# Patient Record
Sex: MALE | Race: WHITE | NOT HISPANIC OR LATINO
[De-identification: names, ages, dates, MRNs, and addresses within clinical notes are randomized per-mention and may not be internally consistent; named-entity substitution may affect disease eponyms.]

---

## 2020-01-15 ENCOUNTER — APPOINTMENT (OUTPATIENT)
Dept: CARDIOLOGY | Facility: CLINIC | Age: 55
End: 2020-01-15
Payer: COMMERCIAL

## 2020-01-15 ENCOUNTER — NON-APPOINTMENT (OUTPATIENT)
Age: 55
End: 2020-01-15

## 2020-01-15 VITALS
HEIGHT: 75 IN | TEMPERATURE: 98.3 F | BODY MASS INDEX: 29.59 KG/M2 | OXYGEN SATURATION: 95 % | DIASTOLIC BLOOD PRESSURE: 72 MMHG | HEART RATE: 75 BPM | SYSTOLIC BLOOD PRESSURE: 130 MMHG | WEIGHT: 238 LBS

## 2020-01-15 DIAGNOSIS — Z78.9 OTHER SPECIFIED HEALTH STATUS: ICD-10-CM

## 2020-01-15 DIAGNOSIS — Z83.3 FAMILY HISTORY OF DIABETES MELLITUS: ICD-10-CM

## 2020-01-15 DIAGNOSIS — I51.7 CARDIOMEGALY: ICD-10-CM

## 2020-01-15 DIAGNOSIS — I77.810 THORACIC AORTIC ECTASIA: ICD-10-CM

## 2020-01-15 DIAGNOSIS — Z86.39 PERSONAL HISTORY OF OTHER ENDOCRINE, NUTRITIONAL AND METABOLIC DISEASE: ICD-10-CM

## 2020-01-15 DIAGNOSIS — Z00.00 ENCOUNTER FOR GENERAL ADULT MEDICAL EXAMINATION W/OUT ABNORMAL FINDINGS: ICD-10-CM

## 2020-01-15 DIAGNOSIS — Z82.5 FAMILY HISTORY OF ASTHMA AND OTHER CHRONIC LOWER RESPIRATORY DISEASES: ICD-10-CM

## 2020-01-15 DIAGNOSIS — Z86.010 PERSONAL HISTORY OF COLONIC POLYPS: ICD-10-CM

## 2020-01-15 DIAGNOSIS — M50.90 CERVICAL DISC DISORDER, UNSPECIFIED, UNSPECIFIED CERVICAL REGION: ICD-10-CM

## 2020-01-15 DIAGNOSIS — Z87.898 PERSONAL HISTORY OF OTHER SPECIFIED CONDITIONS: ICD-10-CM

## 2020-01-15 DIAGNOSIS — Z86.79 PERSONAL HISTORY OF OTHER DISEASES OF THE CIRCULATORY SYSTEM: ICD-10-CM

## 2020-01-15 DIAGNOSIS — Z83.438 FAMILY HISTORY OF OTHER DISORDER OF LIPOPROTEIN METABOLISM AND OTHER LIPIDEMIA: ICD-10-CM

## 2020-01-15 DIAGNOSIS — Z80.1 FAMILY HISTORY OF MALIGNANT NEOPLASM OF TRACHEA, BRONCHUS AND LUNG: ICD-10-CM

## 2020-01-15 DIAGNOSIS — Z82.49 FAMILY HISTORY OF ISCHEMIC HEART DISEASE AND OTHER DISEASES OF THE CIRCULATORY SYSTEM: ICD-10-CM

## 2020-01-15 DIAGNOSIS — Z85.828 PERSONAL HISTORY OF OTHER MALIGNANT NEOPLASM OF SKIN: ICD-10-CM

## 2020-01-15 DIAGNOSIS — Z81.8 FAMILY HISTORY OF OTHER MENTAL AND BEHAVIORAL DISORDERS: ICD-10-CM

## 2020-01-15 PROCEDURE — 99205 OFFICE O/P NEW HI 60 MIN: CPT

## 2020-01-15 PROCEDURE — 93000 ELECTROCARDIOGRAM COMPLETE: CPT

## 2020-01-15 RX ORDER — UBIDECARENONE/VIT E ACET 100MG-5
CAPSULE ORAL
Refills: 0 | Status: ACTIVE | COMMUNITY

## 2020-01-16 PROBLEM — Z00.00 ENCOUNTER FOR PREVENTIVE HEALTH EXAMINATION: Status: ACTIVE | Noted: 2020-01-07

## 2020-01-16 PROBLEM — I77.810 ASCENDING AORTA DILATATION: Status: ACTIVE | Noted: 2020-01-16

## 2020-01-16 PROBLEM — I51.7 LEFT VENTRICULAR HYPERTROPHY: Status: ACTIVE | Noted: 2020-01-16

## 2020-01-16 RX ORDER — ATORVASTATIN CALCIUM 80 MG/1
TABLET, FILM COATED ORAL
Refills: 0 | Status: ACTIVE | COMMUNITY

## 2020-01-16 NOTE — DISCUSSION/SUMMARY
[FreeTextEntry1] : Ascending Aorta Dilatation\par \par The 1/20 echocardiogram demonstrated mild aortic root and ascending aortic dilatation (4.5 cm). A bicuspid aortic valve could not be ruled out. There is no history of hypertension. The  presents physical examination is not suggestive of hemodynamically significant valvular pathology. Surgical intervention is not required for aneurysms less than 5 cm. \par \par I have recommended the following:\par 1. repeat echocardiogram in one year\par 2.  chest CAT scan depending on followup studies\par \par \par \par Left ventricular hypertrophy\par The 1/20 echocardiogram showed evidence of  left ventricular hypertrophy. The left ventricular posterior wall dimension was 1.18 cm . The interventricular septal dimension was 1.9 cm. The etiology is obscure. There is no history of hypertension. The possibility of sleep apnea is being investigated . The degree of obesity does not appear to be severe enough to account for significant left ventricular hypertrophy.\par \par \par I have recommended  the following:\par 1. repeat echocardiogram in one year\par \par \par Hyperlipidemia\par Hyperlipidemia represents a risk factor for the development of atherosclerotic heart disease. However, there is no evidence of such to date. A  1/20 CT of the heart calcium score was zero. The resting 1/20 electrocardiogram shows no evidence of myocardial ischemia or infarction. The target LDL level primary prevention is about 100. HMG co A reductase inhibitor therapy has been effective. In  10/19  the serum  cholesterol level was 163 triglycerides 75 HDL 49 LDL 99. Nonpharmacological therapy, specifically diet exercise and weight loss are  emphasized as  major aspects of  treatment.\par \par I have recommended the following:\par 1 low-salt low-fat low-cholesterol diet. Regular aerobic exercise\par 2 continue present medical regimen\par 3 target LDL level to about 100 as discussed above\par \par The diagnosis, prognosis, risks, options and alternatives were explained at length with the patient. All  questions were answered . Issues discussed included aortic root dilatation ascending aortic dilatation, sleep apnea and left ventricular hypertrophy.\par \par \par \par

## 2020-01-16 NOTE — HISTORY OF PRESENT ILLNESS
[FreeTextEntry1] : 55-year-old and\par Cardiology  consultation requested because of an abnormal echocardiogram\par \par Mr. Presley has no known heart disease. There is no history of a myocardial infarction ,angina heart failure  or valve pathology.  A  1/20 echocardiogram was performed in evaluation of a heart murmur. . The study demonstrated moderate to severe left ventricular hypertrophy and aortic valve sclerosis. A bicuspid aortic valve cannot be ruled out. Dilatation of the ascending aorta at 4.5 cm was noted.  A cardiology consultation was then requested.\par \par Tima has no history of hypertension diabetes smoking or illicit drug use. He denies symptoms of chest pain, shortness of breath, palpitations or syncope. Evaluation for sleep apnea is pending.\par \par Mr Presley presents  today for cardiovascular evaluation.\par \par

## 2020-01-16 NOTE — PHYSICAL EXAM
[Normal Conjunctiva] : the conjunctiva exhibited no abnormalities [Heart Rate And Rhythm] : heart rate and rhythm were normal [Heart Sounds] : normal S1 and S2 [Bowel Sounds] : normal bowel sounds [Auscultation Breath Sounds / Voice Sounds] : lungs were clear to auscultation bilaterally [Abdomen Tenderness] : non-tender [Abdomen Soft] : soft [Abnormal Walk] : normal gait [] : no rash [Affect] : the affect was normal [FreeTextEntry1] : pleasant

## 2020-12-19 ENCOUNTER — NON-APPOINTMENT (OUTPATIENT)
Age: 55
End: 2020-12-19

## 2020-12-28 DIAGNOSIS — I35.8 OTHER NONRHEUMATIC AORTIC VALVE DISORDERS: ICD-10-CM

## 2021-02-17 ENCOUNTER — APPOINTMENT (OUTPATIENT)
Dept: CARDIOLOGY | Facility: CLINIC | Age: 56
End: 2021-02-17

## 2021-02-24 ENCOUNTER — APPOINTMENT (OUTPATIENT)
Dept: CARDIOLOGY | Facility: CLINIC | Age: 56
End: 2021-02-24

## 2021-03-25 ENCOUNTER — APPOINTMENT (OUTPATIENT)
Dept: CARDIOLOGY | Facility: CLINIC | Age: 56
End: 2021-03-25

## 2022-11-03 ENCOUNTER — HOSPITAL ENCOUNTER (EMERGENCY)
Age: 57
Discharge: LEFT BEFORE DISCHARGE | End: 2022-11-03

## 2022-11-03 ENCOUNTER — APPOINTMENT (OUTPATIENT)
Dept: GENERAL RADIOLOGY | Age: 57
End: 2022-11-03
Attending: EMERGENCY MEDICINE

## 2022-11-03 VITALS
OXYGEN SATURATION: 97 % | DIASTOLIC BLOOD PRESSURE: 92 MMHG | TEMPERATURE: 98.2 F | WEIGHT: 235.01 LBS | HEART RATE: 75 BPM | SYSTOLIC BLOOD PRESSURE: 151 MMHG | RESPIRATION RATE: 17 BRPM

## 2022-11-03 PROCEDURE — 73140 X-RAY EXAM OF FINGER(S): CPT

## 2022-11-03 PROCEDURE — 10003627 HB COUNTER ED NO SERVICE

## 2022-11-03 PROCEDURE — 10004651 HB RX, NO CHARGE ITEM: Performed by: EMERGENCY MEDICINE

## 2022-11-03 RX ORDER — ACETAMINOPHEN 325 MG/1
650 TABLET ORAL ONCE
Status: COMPLETED | OUTPATIENT
Start: 2022-11-03 | End: 2022-11-03

## 2022-11-03 RX ADMIN — ACETAMINOPHEN 650 MG: 325 TABLET, FILM COATED ORAL at 15:30

## 2022-11-03 ASSESSMENT — PAIN SCALES - GENERAL: PAINLEVEL_OUTOF10: 3
